# Patient Record
Sex: MALE | Race: WHITE
[De-identification: names, ages, dates, MRNs, and addresses within clinical notes are randomized per-mention and may not be internally consistent; named-entity substitution may affect disease eponyms.]

---

## 2017-11-25 ENCOUNTER — HOSPITAL ENCOUNTER (EMERGENCY)
Dept: HOSPITAL 55 - ED | Age: 22
Discharge: HOME | DRG: 563 | End: 2017-11-25
Payer: COMMERCIAL

## 2017-11-25 VITALS
OXYGEN SATURATION: 100 % | RESPIRATION RATE: 20 BRPM | HEART RATE: 80 BPM | TEMPERATURE: 98.1 F | OXYGEN SATURATION: 100 % | OXYGEN SATURATION: 100 % | OXYGEN SATURATION: 100 % | SYSTOLIC BLOOD PRESSURE: 117 MMHG | DIASTOLIC BLOOD PRESSURE: 64 MMHG

## 2017-11-25 DIAGNOSIS — S92.062A: ICD-10-CM

## 2017-11-25 DIAGNOSIS — S52.612A: ICD-10-CM

## 2017-11-25 DIAGNOSIS — S52.572A: Primary | ICD-10-CM
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
## 2017-11-25 DIAGNOSIS — M25.571: ICD-10-CM

## 2017-11-25 PROCEDURE — 99285 EMERGENCY DEPT VISIT HI MDM: CPT

## 2017-11-25 PROCEDURE — 73110 X-RAY EXAM OF WRIST: CPT

## 2017-11-25 PROCEDURE — 73610 X-RAY EXAM OF ANKLE: CPT

## 2017-11-25 PROCEDURE — 73700 CT LOWER EXTREMITY W/O DYE: CPT

## 2017-11-27 ENCOUNTER — HOSPITAL ENCOUNTER (OUTPATIENT)
Dept: HOSPITAL 55 - CONVCARE | Age: 22
Discharge: HOME | DRG: 561 | End: 2017-11-27
Attending: ORTHOPAEDIC SURGERY
Payer: COMMERCIAL

## 2017-11-27 DIAGNOSIS — S52.572D: Primary | ICD-10-CM

## 2017-11-27 PROCEDURE — 73200 CT UPPER EXTREMITY W/O DYE: CPT

## 2018-02-05 ENCOUNTER — HOSPITAL ENCOUNTER (OUTPATIENT)
Dept: HOSPITAL 55 - CONVCARE | Age: 23
Discharge: HOME | DRG: 561 | End: 2018-02-05
Attending: ORTHOPAEDIC SURGERY
Payer: SELF-PAY

## 2018-02-05 DIAGNOSIS — S52.572D: Primary | ICD-10-CM

## 2018-02-05 PROCEDURE — 73110 X-RAY EXAM OF WRIST: CPT

## 2018-03-12 ENCOUNTER — HOSPITAL ENCOUNTER (OUTPATIENT)
Dept: HOSPITAL 55 - CONVCARE | Age: 23
Discharge: HOME | DRG: 561 | End: 2018-03-12
Attending: ORTHOPAEDIC SURGERY
Payer: COMMERCIAL

## 2018-03-12 DIAGNOSIS — S52.502D: Primary | ICD-10-CM

## 2018-03-12 DIAGNOSIS — Z98.890: ICD-10-CM

## 2018-03-12 PROCEDURE — 73110 X-RAY EXAM OF WRIST: CPT

## 2019-02-28 ENCOUNTER — CARE COORDINATION (OUTPATIENT)
Dept: CARDIOLOGY | Facility: CLINIC | Age: 24
End: 2019-02-28

## 2019-02-28 DIAGNOSIS — Q24.4 SUBAORTIC MEMBRANE: Primary | ICD-10-CM

## 2019-09-09 ENCOUNTER — HOSPITAL ENCOUNTER (OUTPATIENT)
Dept: HOSPITAL 55 - CONVCARE | Age: 24
Discharge: HOME | End: 2019-09-09
Payer: COMMERCIAL

## 2019-10-12 ENCOUNTER — TELEPHONE (OUTPATIENT)
Dept: CARDIOLOGY | Facility: CLINIC | Age: 24
End: 2019-10-12

## 2019-10-16 NOTE — TELEPHONE ENCOUNTER
Reached out to garry Lockwood is currently on Jury Duty and will call back in a few days after he has [email or web address] out his schedule for work as to when he can come in for an [email or web address]    Will wait for call back.